# Patient Record
Sex: FEMALE | Race: OTHER | Employment: UNEMPLOYED | URBAN - METROPOLITAN AREA
[De-identification: names, ages, dates, MRNs, and addresses within clinical notes are randomized per-mention and may not be internally consistent; named-entity substitution may affect disease eponyms.]

---

## 2022-04-27 NOTE — PATIENT DISCUSSION
Reviewed visual field with patient. Continue yearly VF and yearly RNFL. Next VF due 04/22. Next RNFL due 12/22.

## 2022-07-22 ENCOUNTER — NEW PATIENT (OUTPATIENT)
Dept: URBAN - METROPOLITAN AREA CLINIC 39 | Facility: CLINIC | Age: 52
End: 2022-07-22

## 2022-07-22 DIAGNOSIS — Z97.3: ICD-10-CM

## 2022-07-22 DIAGNOSIS — H04.123: ICD-10-CM

## 2022-07-22 DIAGNOSIS — H52.7: ICD-10-CM

## 2022-07-22 PROCEDURE — 92250 FUNDUS PHOTOGRAPHY W/I&R: CPT

## 2022-07-22 PROCEDURE — 99499RLE REFRACTIVE CONSULT/RLE

## 2022-07-22 PROCEDURE — 92134 CPTRZ OPH DX IMG PST SGM RTA: CPT

## 2022-07-22 PROCEDURE — 92025 CPTRIZED CORNEAL TOPOGRAPHY: CPT

## 2022-07-22 RX ORDER — CYCLOSPORINE 0 MG/ML: 1 SOLUTION/ DROPS OPHTHALMIC; TOPICAL

## 2022-07-22 ASSESSMENT — VISUAL ACUITY
OS_CC: J3
OD_CC: J3
OS_SC: >J12
OS_CC: 20/20-1
OD_CC: 20/20-2
OS_SC: CF 3FT
OD_SC: CF 6FT
OD_BAT: 20/25
OD_SC: >J12
OS_BAT: 20/25

## 2022-07-22 ASSESSMENT — TONOMETRY
OS_IOP_MMHG: 14
OD_IOP_MMHG: 12